# Patient Record
Sex: MALE | Race: WHITE | URBAN - METROPOLITAN AREA
[De-identification: names, ages, dates, MRNs, and addresses within clinical notes are randomized per-mention and may not be internally consistent; named-entity substitution may affect disease eponyms.]

---

## 2020-10-01 ENCOUNTER — OFFICE VISIT (OUTPATIENT)
Dept: URBAN - METROPOLITAN AREA CLINIC 75 | Facility: CLINIC | Age: 70
End: 2020-10-01
Payer: COMMERCIAL

## 2020-10-01 DIAGNOSIS — H52.4 PRESBYOPIA: ICD-10-CM

## 2020-10-01 DIAGNOSIS — E11.9 TYPE 2 DIABETES MELLITUS W/O COMPLICATION: ICD-10-CM

## 2020-10-01 DIAGNOSIS — H25.813 COMBINED FORMS OF AGE-RELATED CATARACT, BILATERAL: Primary | ICD-10-CM

## 2020-10-01 DIAGNOSIS — D31.30 BENIGN NEOPLASM OF UNSPECIFIED CHOROID: ICD-10-CM

## 2020-10-01 PROCEDURE — 99204 OFFICE O/P NEW MOD 45 MIN: CPT | Performed by: OPTOMETRIST

## 2020-10-01 ASSESSMENT — KERATOMETRY
OD: 43.13
OS: 43.38

## 2020-10-01 ASSESSMENT — INTRAOCULAR PRESSURE
OS: 11
OD: 15

## 2020-10-01 ASSESSMENT — VISUAL ACUITY
OS: 20/50
OD: 20/30

## 2020-10-01 NOTE — IMPRESSION/PLAN
Impression: Presbyopia: H52.4. Plan: New glasses rx given to patient though pt can continue to use OTC readers only if he prefers.  Suggest +3.00

## 2020-10-01 NOTE — IMPRESSION/PLAN
Impression: Combined forms of age-related cataract, bilateral: H25.813 - OS>OD. discussed can cause difficulty w/ va and glare. Pt functioning well with updated refraction  Plan: Pt prefers to observe for now and can consider surgical intervention when cataracts begin to cause more difficulty w/ va and glare.

## 2020-10-01 NOTE — IMPRESSION/PLAN
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 superior nevus OS. Plan: Monitor for changes in shape/size.

## 2020-10-01 NOTE — IMPRESSION/PLAN
Impression: Type 2 diabetes mellitus w/o complication: P53.1 - exam/OPTOS reveals no evidence of diabetic retinopathy in either eye Plan: Monitor annually

## 2022-05-19 ENCOUNTER — OFFICE VISIT (OUTPATIENT)
Dept: URBAN - METROPOLITAN AREA CLINIC 75 | Facility: CLINIC | Age: 72
End: 2022-05-19
Payer: COMMERCIAL

## 2022-05-19 DIAGNOSIS — H43.811 VITREOUS DEGENERATION, RIGHT EYE: ICD-10-CM

## 2022-05-19 DIAGNOSIS — D31.32 BENIGN NEOPLASM OF LEFT CHOROID: ICD-10-CM

## 2022-05-19 DIAGNOSIS — D31.31 BENIGN NEOPLASM OF RIGHT CHOROID: ICD-10-CM

## 2022-05-19 DIAGNOSIS — H52.4 PRESBYOPIA: ICD-10-CM

## 2022-05-19 DIAGNOSIS — H25.813 COMBINED FORMS OF AGE-RELATED CATARACT, BILATERAL: ICD-10-CM

## 2022-05-19 DIAGNOSIS — E11.9 TYPE 2 DIABETES MELLITUS W/O COMPLICATION: Primary | ICD-10-CM

## 2022-05-19 PROCEDURE — 99214 OFFICE O/P EST MOD 30 MIN: CPT | Performed by: OPTOMETRIST

## 2022-05-19 ASSESSMENT — INTRAOCULAR PRESSURE
OD: 14
OS: 15

## 2022-05-19 ASSESSMENT — VISUAL ACUITY
OD: 20/20
OS: 20/25

## 2022-05-19 NOTE — IMPRESSION/PLAN
Impression: Benign neoplasm of left choroid: D31.32. Plan: Optos ordered. Nevus appears flat and stable compared to previous imaging.

## 2022-05-19 NOTE — IMPRESSION/PLAN
Impression: Type 2 diabetes mellitus w/o complication: Y07.7. Plan: Discussed diagnosis in detail with patient. Emphasized blood sugar control. Optos ordered. No signs of diabetic retinopathy OU.

## 2022-05-19 NOTE — IMPRESSION/PLAN
Impression: Benign neoplasm of right choroid: D31.31. Plan: Optos ordered. Nevus appears flat and stable compared to previous imaging.

## 2023-06-12 ENCOUNTER — OFFICE VISIT (OUTPATIENT)
Dept: URBAN - METROPOLITAN AREA CLINIC 75 | Facility: CLINIC | Age: 73
End: 2023-06-12
Payer: COMMERCIAL

## 2023-06-12 DIAGNOSIS — B39.9 HISTOPLASMOSIS, UNSPECIFIED: ICD-10-CM

## 2023-06-12 DIAGNOSIS — E11.9 TYPE 2 DIABETES MELLITUS W/O COMPLICATION: ICD-10-CM

## 2023-06-12 DIAGNOSIS — D49.81: Primary | ICD-10-CM

## 2023-06-12 DIAGNOSIS — H25.813 COMBINED FORMS OF AGE-RELATED CATARACT, BILATERAL: ICD-10-CM

## 2023-06-12 PROCEDURE — 92250 FUNDUS PHOTOGRAPHY W/I&R: CPT | Performed by: OPTOMETRIST

## 2023-06-12 PROCEDURE — 99213 OFFICE O/P EST LOW 20 MIN: CPT | Performed by: OPTOMETRIST

## 2023-06-12 ASSESSMENT — INTRAOCULAR PRESSURE
OD: 14
OS: 12

## 2023-06-12 NOTE — IMPRESSION/PLAN
Impression: Type 2 diabetes mellitus w/o complication: V46.9. Optos ordered and performed revealing Neg DME at this time Plan: Diabetes type II: no background retinopathy, no signs of neovascularization noted. Discussed ocular and systemic benefits of blood sugar control.

## 2023-06-12 NOTE — IMPRESSION/PLAN
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

 Optos ordered and performed revealing Neg DME at this time Plan: Diabetes type II: no background retinopathy, no signs of neovascularization noted. Discussed ocular and systemic benefits of blood sugar control.

## 2023-06-12 NOTE — IMPRESSION/PLAN
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


 Optos ordered and performed revealing flat Plan: A choroidal nevus is typically a darkly pigmented lesion found in the back of the eye. It is similar to a freckle or mole found on the skin and arises from the pigment-containing cells in the choroid, the layer of the eye just under the white outer wall (sclera).

## 2023-06-12 NOTE — IMPRESSION/PLAN
Impression: Histoplasmosis, unspecified: B39.9. Plan: Histoplasmosis is an infection caused by breathing in spores of a fungus often found in bird and bat droppings. People usually get it from breathing in these spores when they become airborne during demolition or cleanup projects.

## 2024-06-24 ENCOUNTER — OFFICE VISIT (OUTPATIENT)
Dept: URBAN - METROPOLITAN AREA CLINIC 75 | Facility: CLINIC | Age: 74
End: 2024-06-24
Payer: COMMERCIAL

## 2024-06-24 DIAGNOSIS — H52.4 PRESBYOPIA: ICD-10-CM

## 2024-06-24 DIAGNOSIS — D31.31 BENIGN NEOPLASM OF RIGHT CHOROID: ICD-10-CM

## 2024-06-24 DIAGNOSIS — D49.81: ICD-10-CM

## 2024-06-24 DIAGNOSIS — E11.9 TYPE 2 DIABETES MELLITUS W/O COMPLICATION: Primary | ICD-10-CM

## 2024-06-24 DIAGNOSIS — D31.32 BENIGN NEOPLASM OF LEFT CHOROID: ICD-10-CM

## 2024-06-24 PROCEDURE — 99214 OFFICE O/P EST MOD 30 MIN: CPT | Performed by: OPTOMETRIST

## 2024-06-24 ASSESSMENT — VISUAL ACUITY
OS: 20/20
OD: 20/20

## 2024-06-24 ASSESSMENT — INTRAOCULAR PRESSURE
OD: 11
OS: 10

## 2025-07-01 ENCOUNTER — OFFICE VISIT (OUTPATIENT)
Dept: URBAN - METROPOLITAN AREA CLINIC 75 | Facility: CLINIC | Age: 75
End: 2025-07-01
Payer: COMMERCIAL

## 2025-07-01 DIAGNOSIS — B39.9 HISTOPLASMOSIS, UNSPECIFIED: ICD-10-CM

## 2025-07-01 DIAGNOSIS — D31.32 BENIGN NEOPLASM OF LEFT CHOROID: ICD-10-CM

## 2025-07-01 DIAGNOSIS — H52.4 PRESBYOPIA: ICD-10-CM

## 2025-07-01 DIAGNOSIS — H25.813 COMBINED FORMS OF AGE-RELATED CATARACT, BILATERAL: ICD-10-CM

## 2025-07-01 DIAGNOSIS — D31.31 BENIGN NEOPLASM OF RIGHT CHOROID: ICD-10-CM

## 2025-07-01 DIAGNOSIS — E11.9 TYPE 2 DIABETES MELLITUS W/O COMPLICATION: Primary | ICD-10-CM

## 2025-07-01 PROCEDURE — 99213 OFFICE O/P EST LOW 20 MIN: CPT | Performed by: OPTOMETRIST

## 2025-07-01 ASSESSMENT — VISUAL ACUITY
OS: 20/20
OD: 20/20